# Patient Record
Sex: MALE | NOT HISPANIC OR LATINO | URBAN - METROPOLITAN AREA
[De-identification: names, ages, dates, MRNs, and addresses within clinical notes are randomized per-mention and may not be internally consistent; named-entity substitution may affect disease eponyms.]

---

## 2023-02-28 ENCOUNTER — DOCUMENTATION (OUTPATIENT)
Dept: OCCUPATIONAL MEDICINE | Facility: CLINIC | Age: 58
End: 2023-02-28
Payer: COMMERCIAL

## 2023-02-28 NOTE — PROGRESS NOTES
Pt has an appointment for Pre-Employment Physical with [facility] on 03/02/23.    Currently missing the following vaccine documentation:    Varicella  MMR  Hep B  Flu Shot    Contacted via email on 02/28/2023, requesting missing documentation. If unable to obtain by the appointment  date, lab titers will be drawn, and/or vaccines will be given.

## 2023-03-02 ENCOUNTER — HOSPITAL ENCOUNTER (OUTPATIENT)
Facility: MEDICAL CENTER | Age: 58
End: 2023-03-02
Attending: NURSE PRACTITIONER
Payer: COMMERCIAL

## 2023-03-02 ENCOUNTER — EMPLOYEE HEALTH (OUTPATIENT)
Dept: OCCUPATIONAL MEDICINE | Facility: CLINIC | Age: 58
End: 2023-03-02
Payer: COMMERCIAL

## 2023-03-02 DIAGNOSIS — Z02.89 ENCOUNTER FOR OCCUPATIONAL HEALTH ASSESSMENT: ICD-10-CM

## 2023-03-02 DIAGNOSIS — Z02.1 PRE-EMPLOYMENT HEALTH SCREENING EXAMINATION: ICD-10-CM

## 2023-03-02 DIAGNOSIS — Z02.89 ENCOUNTER FOR OCCUPATIONAL HEALTH ASSESSMENT: Primary | ICD-10-CM

## 2023-03-02 PROCEDURE — 8915 PR COMPREHENSIVE PHYSICAL: Performed by: NURSE PRACTITIONER

## 2023-03-03 LAB
AMP AMPHETAMINE: NORMAL
BAR BARBITURATES: NORMAL
BZO BENZODIAZEPINES: NORMAL
COC COCAINE: NORMAL
GAMMA INTERFERON BACKGROUND BLD IA-ACNC: 0.05 IU/ML
INT CON NEG: NORMAL
INT CON POS: NORMAL
M TB IFN-G BLD-IMP: NEGATIVE
M TB IFN-G CD4+ BCKGRND COR BLD-ACNC: 0.11 IU/ML
MDMA ECSTASY: NORMAL
MET METHAMPHETAMINES: NORMAL
MITOGEN IGNF BCKGRD COR BLD-ACNC: >10 IU/ML
MTD METHADONE: NORMAL
OPI OPIATES: NORMAL
OXY OXYCODONE: NORMAL
PCP PHENCYCLIDINE: NORMAL
POC URINE DRUG SCREEN OCDRS: NORMAL
QFT TB2 - NIL TBQ2: 0.08 IU/ML
THC: NORMAL

## 2023-03-04 LAB
MEV IGG SER-ACNC: 49 AU/ML
MUV IGG SER IA-ACNC: >300 AU/ML
RUBV AB SER QL: 11 IU/ML
VZV IGG SER IA-ACNC: >4000 IV

## 2023-03-15 ENCOUNTER — EH NON-PROVIDER (OUTPATIENT)
Dept: OCCUPATIONAL MEDICINE | Facility: CLINIC | Age: 58
End: 2023-03-15
Payer: COMMERCIAL

## 2023-12-06 ENCOUNTER — OFFICE VISIT (OUTPATIENT)
Dept: URGENT CARE | Facility: PHYSICIAN GROUP | Age: 58
End: 2023-12-06
Payer: COMMERCIAL

## 2023-12-06 VITALS
DIASTOLIC BLOOD PRESSURE: 68 MMHG | WEIGHT: 187 LBS | OXYGEN SATURATION: 97 % | HEART RATE: 61 BPM | BODY MASS INDEX: 24 KG/M2 | SYSTOLIC BLOOD PRESSURE: 130 MMHG | TEMPERATURE: 97.8 F | RESPIRATION RATE: 18 BRPM | HEIGHT: 74 IN

## 2023-12-06 DIAGNOSIS — M25.561 ACUTE PAIN OF RIGHT KNEE: ICD-10-CM

## 2023-12-06 PROCEDURE — 99203 OFFICE O/P NEW LOW 30 MIN: CPT | Performed by: PHYSICIAN ASSISTANT

## 2023-12-06 PROCEDURE — 3075F SYST BP GE 130 - 139MM HG: CPT | Performed by: PHYSICIAN ASSISTANT

## 2023-12-06 PROCEDURE — 3078F DIAST BP <80 MM HG: CPT | Performed by: PHYSICIAN ASSISTANT

## 2023-12-06 RX ORDER — PREDNISONE 20 MG/1
40 TABLET ORAL DAILY
Qty: 10 TABLET | Refills: 0 | Status: SHIPPED | OUTPATIENT
Start: 2023-12-06 | End: 2023-12-11

## 2023-12-06 NOTE — PROGRESS NOTES
"Subjective:   Alejo Roach is a 58 y.o. male who presents for Knee Injury (X 2 days Rt knee pain)      HPI  The patient presents to the Urgent Care with complaints of right knee pain onset 2 to 3 days ago.  No known injury or trauma but the pain started sometime after work.  Noticed the pain while walking upstairs.  Pain is located directly to his kneecap.  He does not perform a lot of kneeling at work.  He stands a lot at work.  No fevers or chills.  Some swelling to the knee.  Slight limping from the pain.  History of gout but usually has gout to his foot or ankle.                History reviewed. No pertinent past medical history.  No Known Allergies     Objective:     /68   Pulse 61   Temp 36.6 °C (97.8 °F) (Temporal)   Resp 18   Ht 1.88 m (6' 2\")   Wt 84.8 kg (187 lb)   SpO2 97%   BMI 24.01 kg/m²     Physical Exam  Vitals reviewed.   Constitutional:       General: He is not in acute distress.     Appearance: Normal appearance. He is not ill-appearing or toxic-appearing.   Eyes:      Conjunctiva/sclera: Conjunctivae normal.   Cardiovascular:      Rate and Rhythm: Normal rate.   Pulmonary:      Effort: Pulmonary effort is normal.   Musculoskeletal:      Cervical back: Neck supple.        Legs:       Comments: Mild edema and significant tenderness over the right patella.  Full flexion and extension without reproduction of pain.  No crepitus, palpable joint line tenderness, erythema, or streaking.  Skin intact.  Reproduction of tenderness with knee extension against resistance.  No distal edema.  No posterior knee pain.   Skin:     General: Skin is warm and dry.   Neurological:      General: No focal deficit present.      Mental Status: He is alert and oriented to person, place, and time.   Psychiatric:         Mood and Affect: Mood normal.         Behavior: Behavior normal.         Diagnosis and associated orders:     1. Acute pain of right knee  - predniSONE (DELTASONE) 20 MG Tab; Take 2 Tablets by " mouth every day for 5 days.  Dispense: 10 Tablet; Refill: 0       Comments/MDM:     Discussed differentials.  Possible bursitis.  No fevers or chills.  Skin intact.  No significant erythema.  I do not suspect septic arthritis at this time.  Treatment of prednisone.  Rest, elevation, ice application, Tylenol.  Avoid NSAIDs with this medication.  Work note provided.       I personally reviewed prior external notes and test results pertinent to today's visit. Pathogenesis of diagnosis discussed including typical length and natural progression. Supportive care, natural history, differential diagnoses, and indications for immediate follow-up discussed. Patient expresses understanding and agrees to plan. Patient denies any other questions or concerns.     Follow-up with the primary care physician for recheck, reevaluation, and consideration of further management.    Please note that this dictation was created using voice recognition software. I have made a reasonable attempt to correct obvious errors, but I expect that there are errors of grammar and possibly content that I did not discover before finalizing the note.    This note was electronically signed by Augustin Herrmann PA-C

## 2023-12-06 NOTE — LETTER
December 6, 2023         Patient: Alejo Roach   YOB: 1965   Date of Visit: 12/6/2023           To Whom it May Concern:    Alejo Roach was seen in my clinic on 12/6/2023. Please excuse from work through 12/8/23.     If you have any questions or concerns, please don't hesitate to call.        Sincerely,           Augustin Herrmann P.A.-C.  Electronically Signed